# Patient Record
Sex: MALE | Race: WHITE | NOT HISPANIC OR LATINO | URBAN - METROPOLITAN AREA
[De-identification: names, ages, dates, MRNs, and addresses within clinical notes are randomized per-mention and may not be internally consistent; named-entity substitution may affect disease eponyms.]

---

## 2023-07-05 ENCOUNTER — EMERGENCY (EMERGENCY)
Facility: HOSPITAL | Age: 58
LOS: 1 days | Discharge: HOME | End: 2023-07-05
Attending: INTERNAL MEDICINE | Admitting: INTERNAL MEDICINE
Payer: COMMERCIAL

## 2023-07-05 VITALS
OXYGEN SATURATION: 98 % | DIASTOLIC BLOOD PRESSURE: 84 MMHG | SYSTOLIC BLOOD PRESSURE: 132 MMHG | HEART RATE: 74 BPM | TEMPERATURE: 98 F | WEIGHT: 315 LBS | RESPIRATION RATE: 16 BRPM

## 2023-07-05 PROCEDURE — 99284 EMERGENCY DEPT VISIT MOD MDM: CPT

## 2023-07-05 NOTE — ED PROVIDER NOTE - CARE PROVIDER_API CALL
ARUN TAYLOR AT [facility] [place] [address]  Phone: [phone]  Fax: [phone]  Follow Up Time: 1-3 Days

## 2023-07-05 NOTE — ED ADULT NURSE NOTE - OBJECTIVE STATEMENT
pt is [id number]. S/P Fall off the ladder yesterday. Denies LOC. Was seen at [facility] yesterday.   X ray was negative, C/O pain and swelling to right knee. pending CT Scan.

## 2023-07-05 NOTE — ED ADULT NURSE NOTE - CHIEF COMPLAINT QUOTE
S/P Fall off the ladder yesterday. Denies LOC. Was seen at [facility] yesterday.  X ray done. X ray was negative, C/O pain and swelling to right knee. Requesting a CT Scan.

## 2023-07-05 NOTE — ED PROVIDER NOTE - PATIENT PORTAL LINK FT
You can access the FollowMyHealth Patient Portal offered by [facility] by registering at the following website: [email or web address]. By joining [facility]’s FollowMyHealth portal, you will also be able to view your health information using other applications (apps) compatible with our system.

## 2023-07-05 NOTE — ED ADULT TRIAGE NOTE - CHIEF COMPLAINT QUOTE
S/P Fall off the ladder yesterday. Denies LOC. Was seen at [facility] yesterday.  X ray done. X ray was negative, C/O pain and swelling to right knee. Requesting a CT Scan.

## 2023-07-05 NOTE — ED PROVIDER NOTE - OBJECTIVE STATEMENT
S/P Fall off the ladder yesterday. Denies LOC. Was seen at [facility] yesterday.  X ray done. X ray was negative, C/O pain and swelling to right knee. Requesting a CT Scan.  fall from height S/P Fall off the ladder yesterday. Denies LOC. Was seen at [facility] yesterday.  X ray done. X ray was negative, C/O pain and swelling to right knee. Requesting a CT Scan.  fall from height right knee injury, ortho Dr Gracia, sent the patient for CT 58 y/o Male S/P Fall off the ladder yesterday. Denies LOC. Was seen at [facility] yesterday.  X ray done. X ray was negative, C/O pain and swelling to right knee. Requesting a CT Scan, fall from height right knee injury, ortho Dr Gracia, sent the patient for CT

## 2023-07-06 VITALS
RESPIRATION RATE: 16 BRPM | DIASTOLIC BLOOD PRESSURE: 85 MMHG | OXYGEN SATURATION: 98 % | TEMPERATURE: 98 F | HEART RATE: 77 BPM | SYSTOLIC BLOOD PRESSURE: 138 MMHG

## 2023-07-06 PROCEDURE — 73700 CT LOWER EXTREMITY W/O DYE: CPT | Mod: MA

## 2023-07-06 PROCEDURE — 76376 3D RENDER W/INTRP POSTPROCES: CPT | Mod: 26

## 2023-07-06 PROCEDURE — 76376 3D RENDER W/INTRP POSTPROCES: CPT

## 2023-07-06 PROCEDURE — 73700 CT LOWER EXTREMITY W/O DYE: CPT | Mod: 26,RT,MA

## 2023-07-06 PROCEDURE — 99284 EMERGENCY DEPT VISIT MOD MDM: CPT | Mod: 25

## 2023-07-06 RX ORDER — IBUPROFEN 200 MG
600 TABLET ORAL ONCE
Refills: 0 | Status: COMPLETED | OUTPATIENT
Start: 2023-07-06 | End: 2023-07-06

## 2023-07-06 RX ADMIN — Medication 600 MILLIGRAM(S): at 00:36
